# Patient Record
(demographics unavailable — no encounter records)

---

## 2024-12-05 NOTE — PHYSICAL EXAM
[Well-appearing] : well-appearing [Normocephalic] : normocephalic [No dysmorphic facial features] : no dysmorphic facial features [No ocular abnormalities] : no ocular abnormalities [Neck supple] : neck supple [No abnormal neurocutaneous stigmata or skin lesions] : no abnormal neurocutaneous stigmata or skin lesions [Straight] : straight [No deformities] : no deformities [Alert] : alert [Well related, good eye contact] : well related, good eye contact [Conversant] : conversant [Normal speech and language] : normal speech and language [Follows instructions well] : follows instructions well [VFF] : VFF [Pupils reactive to light and accommodation] : pupils reactive to light and accommodation [Full extraocular movements] : full extraocular movements [No nystagmus] : no nystagmus [Normal facial sensation to light touch] : normal facial sensation to light touch [No facial asymmetry or weakness] : no facial asymmetry or weakness [Gross hearing intact] : gross hearing intact [Equal palate elevation] : equal palate elevation [Good shoulder shrug] : good shoulder shrug [Normal tongue movement] : normal tongue movement [Midline tongue, no fasciculations] : midline tongue, no fasciculations [R handed] : R handed [Normal axial and appendicular muscle tone] : normal axial and appendicular muscle tone [Gets up on table without difficulty] : gets up on table without difficulty [No pronator drift] : no pronator drift [Normal finger tapping and fine finger movements] : normal finger tapping and fine finger movements [No abnormal involuntary movements] : no abnormal involuntary movements [5/5 strength in proximal and distal muscles of arms and legs] : 5/5 strength in proximal and distal muscles of arms and legs [2+ biceps] : 2+ biceps [Knee jerks] : knee jerks [Ankle jerks] : ankle jerks [No ankle clonus] : no ankle clonus [Localizes LT and temperature] : localizes LT and temperature [No dysmetria on FTNT] : no dysmetria on FTNT [Good walking balance] : good walking balance [Normal gait] : normal gait [Able to tandem well] : able to tandem well [Negative Romberg] : negative Romberg [de-identified] : no resp distress, no retractions  [de-identified] : walks well

## 2024-12-05 NOTE — HISTORY OF PRESENT ILLNESS
[FreeTextEntry1] : Since the last visit in Sept 2024  Patient doing well without breakthrough seizures. No missed doses of medication Denies any medication side effects.   First seizure: Sept 2023 Last seizure: Jan 2024 Semiology: GTC x3 min from sleep  AEEG: Sept 2023 Impression This is an abnormal vEEG study due to the following findings: 1. 38 electrographic seizures with 3-3.5Hz generalized spike and wave discharges lasting 5-15 seconds during the recording. 2. Occasional 3-3.5Hz generalized spike and wave discharges in single bursts or runs up to 4 seconds during wakefulness and in sleep. 3. 10 push button episodes were pressed that did not have an electrographic correlate.    Medications:  Depakote 625mg sprinkles BID Nayzilam 5mg/0.1ml PRN Vitamin D 2000u qod  Social Hx:  Switched schools, now attending same school as sister

## 2024-12-05 NOTE — ASSESSMENT
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 Today my neurologic examination is age appropriate without evidence of focal deficits or developmental delay.

## 2025-06-04 NOTE — CONSULT LETTER
[Dear  ___] : Dear  [unfilled], [Courtesy Letter:] : I had the pleasure of seeing your patient, [unfilled], in my office today. [Please see my note below.] : Please see my note below. [Consult Closing:] : Thank you very much for allowing me to participate in the care of this patient.  If you have any questions, please do not hesitate to contact me. [Sincerely,] : Sincerely, [FreeTextEntry3] : Obehioya Irumudomon, MD  of Pediatric Neurology Co-Director of Pediatric Neuromuscular Clinic [employer] [facility] [place]

## 2025-06-04 NOTE — PHYSICAL EXAM
[Well-appearing] : well-appearing [Normocephalic] : normocephalic [No dysmorphic facial features] : no dysmorphic facial features [No ocular abnormalities] : no ocular abnormalities [Alert] : alert [Well related, good eye contact] : well related, good eye contact [Conversant] : conversant [Normal speech and language] : normal speech and language [Follows instructions well] : follows instructions well [Midline tongue, no fasciculations] : midline tongue, no fasciculations [R handed] : R handed [No pronator drift] : no pronator drift [No abnormal involuntary movements] : no abnormal involuntary movements [No dysmetria on FTNT] : no dysmetria on FTNT [de-identified] : no resp distress, no retractions  [de-identified] : grossly intact [de-identified] : walks wel

## 2025-06-04 NOTE — REASON FOR VISIT
[Other Location: e.g. School (Enter Location, City,State)___] : at [unfilled], at the time of the visit. [Other Location: e.g. Home (Enter Location, City,State)___] : at [unfilled] [Telehealth (audio & video)] : This visit was provided via telehealth using real-time 2-way audio visual technology. [FreeTextEntry3] : mother [Follow-Up Evaluation] : a follow-up evaluation for [Seizure Disorder] : seizure disorder [Patient] : patient [Mother] : mother

## 2025-06-04 NOTE — HISTORY OF PRESENT ILLNESS
[FreeTextEntry1] : Since the last visit in Dec 2024  Patient doing well without breakthrough seizures. No missed doses of medication Denies any medication side effects.   First seizure: Sept 2023 Last seizure: Jan 2024 Semiology: GTC x3 min from sleep  AEEG: Sept 2023 Impression This is an abnormal vEEG study due to the following findings: 1. 38 electrographic seizures with 3-3.5Hz generalized spike and wave discharges lasting 5-15 seconds during the recording. 2. Occasional 3-3.5Hz generalized spike and wave discharges in single bursts or runs up to 4 seconds during wakefulness and in sleep. 3. 10 push button episodes were pressed that did not have an electrographic correlate.    Medications:  Depakote 625mg sprinkles BID Nayzilam 5mg/0.1ml PRN Vitamin D 2000u qod

## 2025-06-04 NOTE — ASSESSMENT
[FreeTextEntry1] : 14 year old with generalized epilepsy, well controlled with current medication regimen. Today my neurologic examination is limited due to telehealth.